# Patient Record
Sex: MALE | Race: WHITE | NOT HISPANIC OR LATINO | Employment: OTHER | URBAN - METROPOLITAN AREA
[De-identification: names, ages, dates, MRNs, and addresses within clinical notes are randomized per-mention and may not be internally consistent; named-entity substitution may affect disease eponyms.]

---

## 2021-07-20 ENCOUNTER — TREATMENT (OUTPATIENT)
Dept: PHYSICAL THERAPY | Facility: CLINIC | Age: 61
End: 2021-07-20

## 2021-07-20 DIAGNOSIS — M47.894 THORACIC FACET JOINT SYNDROME: ICD-10-CM

## 2021-07-20 DIAGNOSIS — M54.6 THORACIC SPINE PAIN: Primary | ICD-10-CM

## 2021-07-20 PROCEDURE — 97140 MANUAL THERAPY 1/> REGIONS: CPT | Performed by: PHYSICAL THERAPIST

## 2021-07-20 PROCEDURE — 97014 ELECTRIC STIMULATION THERAPY: CPT | Performed by: PHYSICAL THERAPIST

## 2021-07-20 PROCEDURE — 97110 THERAPEUTIC EXERCISES: CPT | Performed by: PHYSICAL THERAPIST

## 2021-07-20 PROCEDURE — 97035 APP MDLTY 1+ULTRASOUND EA 15: CPT | Performed by: PHYSICAL THERAPIST

## 2021-07-20 PROCEDURE — 97161 PT EVAL LOW COMPLEX 20 MIN: CPT | Performed by: PHYSICAL THERAPIST

## 2021-07-20 NOTE — PROGRESS NOTES
Physical Therapy Initial Evaluation and Plan of Care        Patient: Lance Orellana   : 1960  Diagnosis/ICD-10 Code:  Thoracic spine pain [M54.6]  Referring practitioner: KRAIG Kim  Date of Initial Visit: 2021  Today's Date: 2021  Patient seen for 1 sessions           Subjective Evaluation    History of Present Illness  Mechanism of injury: May 2021, after playing golf, felt like he was sidebending during swing rather than rotating.  Started experiencing spasms during that day. Has hurt some daily since.        Pain  Current pain ratin  At best pain ratin  At worst pain ratin  Location: Intermittent pain right mid/lower thoracic spine. No radiating symptoms.  Quality: squeezing, cramping and discomfort  Relieving factors: rest and support  Aggravating factors: repetitive movement (golfing especially at ball contact, rotation, side bendiing)  Progression: improved    Hand dominance: right             Objective          Postural Observations  Seated posture: fair  Standing posture: fair    Additional Postural Observation Details  Mild increase in thoracic spine kyphosis.    Palpation     Right   Hypertonic in the cervical paraspinals.     Cervical Spine Comments  Right cervical paraspinals: From about T3 to T10.     Tenderness   Cervical Spine   Tenderness in the facet joint (RIght T7).     Neurological Testing     Reflexes   Left   Patellar (L4): normal (2+)  Achilles (S1): normal (2+)    Right   Patellar (L4): normal (2+)  Achilles (S1): normal (2+)    Active Range of Motion   Cervical/Thoracic Spine     Thoracic   Flexion: WFL  Extension: 15 degrees   Left lateral flexion: 30 degrees   Right lateral flexion: 20 degrees with pain  Left rotation: 70 degrees   Right rotation: 70 degrees with pain    Strength/Myotome Testing   Cervical Spine     Left   Normal strength    Right   Normal strength    Lumbar   Left   Normal strength    Right   Normal strength    Tests        Thoracic   Negative slump.     Lumbar     Left   Negative passive SLR and vertical compression.     Right   Negative passive SLR and vertical compression.       Access Code: [email or web address]  URL: [email or web address]  Date: 07/20/2021  Prepared by: Quang Bustillos    Exercises  Seated Thoracic Lumbar Extension - 3 x daily - 7 x weekly - 3 sets - 10 reps  Standing Thoracic Rotation with Dowel - 3 x daily - 7 x weekly - 2 sets - 10 reps      Assessment & Plan     Assessment  Impairments: activity intolerance and pain with function  Assessment details: 61 year old right handed male, presents with right mid thoracic spine pain. He has signs and symptoms of possible mid-Thoracic facet dysfunction around T7/T8 area. Neurological exam is normal today.  Prognosis: good    Goals  Plan Goals: STG to be met in 3 weeks  1.  Reduce pain in right Thoracic spine 3/10.  2.  Pt can rotate 10 x to the right before pain ensues.  3.  Reduce muscle guarding along right thoracic paraspinals  LTG to be met in 6 weeks  1.  Pt can lift without thoracic spine pain.  2.  Pt can play 18 holes of golf without pain.  3.  Pt is independent with all phases of HEP.    Plan  Therapy options: will be seen for skilled physical therapy services  Planned modality interventions: cryotherapy, dry needling, high voltage pulsed current (pain management), high voltage pulsed current (spasm management) and ultrasound  Planned therapy interventions: manual therapy, body mechanics training, functional ROM exercises, home exercise program, joint mobilization, therapeutic activities, stretching, strengthening, spinal/joint mobilization, soft tissue mobilization, postural training and neuromuscular re-education  Frequency: 2x week  Duration in weeks: 6  Treatment plan discussed with: patient        Manual Therapy:    15     mins  76449;  Therapeutic Exercise:    10     mins  78493;     Neuromuscular Demar:        mins  20877;    Therapeutic Activity:           mins  76801;     Gait Training:           mins  56087;     Ultrasound:     13     mins  13839;    Electrical Stimulation:    20     mins  43680 ( );  Iontophoresis          mins 50139   Traction          mins  63448  Fluidotherapy          mins  66883  Dry Needling          mins self-pay  Traction          mins  65275    Timed Treatment:   38   mins   Total Treatment:     80   mins    PT SIGNATURE: Quang Bustillos, PT   DATE TREATMENT INITIATED: 7/20/2021    Initial Certification  Certification Period: 10/18/2021  I certify that the therapy services are furnished while this patient is under my care.  The services outlined above are required by this patient, and will be reviewed every 90 days.     PHYSICIAN: _____________________________________________     Kena Solano PA        DATE: __________________________________________________    Please sign and return via fax to [phone].. Thank you, [facility].

## 2025-06-24 ENCOUNTER — TREATMENT (OUTPATIENT)
Dept: PHYSICAL THERAPY | Facility: CLINIC | Age: 65
End: 2025-06-24
Payer: MEDICARE

## 2025-06-24 ENCOUNTER — TRANSCRIBE ORDERS (OUTPATIENT)
Dept: PHYSICAL THERAPY | Facility: CLINIC | Age: 65
End: 2025-06-24
Payer: MEDICARE

## 2025-06-24 DIAGNOSIS — M54.2 NECK PAIN: Primary | ICD-10-CM

## 2025-06-24 DIAGNOSIS — M54.2 NECK PAIN ON RIGHT SIDE: ICD-10-CM

## 2025-06-24 DIAGNOSIS — M62.89 MUSCULAR IMBALANCE: Primary | ICD-10-CM

## 2025-06-24 PROCEDURE — 97162 PT EVAL MOD COMPLEX 30 MIN: CPT | Performed by: PHYSICAL THERAPIST

## 2025-06-24 PROCEDURE — 97535 SELF CARE MNGMENT TRAINING: CPT | Performed by: PHYSICAL THERAPIST

## 2025-06-24 NOTE — PROGRESS NOTES
Physical Therapy Initial Evaluation and Plan of Care  [address], [address], [place] [id number]    Patient: Lance Orellana   : 1960  Diagnosis/ICD-10 Code:  Neck pain [M54.2]  Referring practitioner: Felecia Blakely DO  Date of Initial Visit: 2025  Today's Date: 2025  Patient seen for 1 session         Visit Diagnoses:    ICD-10-CM ICD-9-CM   1. Neck pain  M54.2 723.1         Subjective Questionnaire: NDI:12%    There are no active problems to display for this patient.    No past medical history on file.  No past surgical history on file.    Subjective Evaluation    History of Present Illness  Date of onset: 2022  Mechanism of injury: Patient notes right sided neck pain off and on the last 2 years. Attributes it to impaired sleep and sleep apnea.  Sleeps on left side usually, seems to be worse upon waking.  Had basal cell cancer removed on the left neck and has had to sleep on his right side more. That has helped him to some degree.  No treatment to this point until he saw  and did OMT last week with some relief.  Notes nagging and chronic, likes to work out at home.  Notes pain with left cervical rotation, notes some increase pin sneezing. Pain is uncomfortable but doesn't keep him from doing anything. No orthopedic neck surgery history. Axial pain without UE referral. Home exercise routine includes back to wall scapular mobility, wall slides, pec fly movements. Weights: external rotation in different planes, low row, high row. Tried [employer] program through his insurance without relief.  Had thoracic pain a few years ago and had successful improvement after one session of dry needling, is interested.       Patient Occupation: retired  Pain  Current pain ratin  At best pain ratin  At worst pain ratin  Quality: tight and discomfort  Progression: worsening    Hand dominance: right    Treatments  No previous or current treatments  Patient  Goals  Patient goals for therapy: decreased pain and increased motion           Objective          Postural Observations    Additional Postural Observation Details  Large bandaids on the left posterior neck from recent cancer surgery.  Right shoulder lower than left    Tenderness     Additional Tenderness Details  Tender and guarded along the right upper trap with atrophy compared to the left side.  Limited palpation by presence of bandage on the left psoterior neck.  Possible atrophy of the right paravertebral muscles and levator, but difficult to compare.     Neurological Testing     Sensation   Cervical/Thoracic   Left   Intact: light touch    Right   Intact: light touch    Reflexes   Left   Biceps (C5/C6): normal (2+)  Brachioradialis (C6): normal (2+)  Triceps (C7): normal (2+)  Delgado's reflex: negative    Right   Biceps (C5/C6): normal (2+)  Brachioradialis (C6): normal (2+)  Triceps (C7): normal (2+)  Delgado's reflex: negative    Active Range of Motion   Cervical/Thoracic Spine   Cervical    Flexion: 20 degrees with pain  Extension: 30 degrees   Left rotation: 60 degrees with pain  Right rotation: 45 degrees with pain    Additional Active Range of Motion Details  Shoulder AROM grossly normal    Strength/Myotome Testing     Right Shoulder     Planes of Motion   Flexion: 4+   Abduction: 4+   External rotation at 0°: 4+     Right Elbow   Flexion: 5  Extension: 5    Additional Strength Details  Did not test left side due to recent procedure    Tests   Cervical     Right   Positive Spurling's sign.           Assessment & Plan       Assessment  Impairments: abnormal or restricted ROM, activity intolerance, impaired physical strength, lacks appropriate home exercise program and pain with function   Functional limitations: carrying objects, lifting, pulling, pushing, uncomfortable because of pain and unable to perform repetitive tasks   Assessment details: Patient presents with chronic right sided neck pain that  appears to be mechanical and axial in nature. Evaluation was made difficult by recent left sided basal cell CA surgery.  He gets sutures removed next week, will resume PT afterwards for more thorough evaluation.  Assuming we will be working on mobilizing the right sided cervical spine, possible dry needling, and scapular stabilization.     Goals  Plan Goals: Short Term Goals 3 weeks  1. Patient to be compliant with initial HEP  2. Patient to demonstrate pain free cervical right rotation.  3 Patient to demonstrate at least 60 degrees right cervical rotation.    Long Term Goals 6 weeks  1. Patient to be independent with HEP.  2. Patient to report reduced HA frequency  3. Patient to resume recreational weight lifting without pain  4. Patient to demonstrate minimal palpable tenderness along the cervical spine.  5. Patient to improve NDI score to at least 4 %.          Plan  Therapy options: will be seen for skilled therapy services  Planned modality interventions: TENS, thermotherapy (hydrocollator packs), cryotherapy and dry needling  Planned therapy interventions: manual therapy, strengthening, stretching, postural training, joint mobilization and neuromuscular re-education  Frequency: 2x week  Duration in visits: 12  Treatment plan discussed with: patient  Plan details: Resuming after suture removal from previous procedure.         History # of Personal Factors and/or Comorbidities: MODERATE (1-2)  Examination of Body System(s): # of elements: MODERATE (3)  Clinical Presentation: EVOLVING  Clinical Decision Making: MODERATE      Timed:         Manual Therapy:    0     mins  76398;     Therapeutic Exercise:    0     mins  22607;     Neuromuscular Demar:    0    mins  50866;    Therapeutic Activity:     0     mins  74496;     Gait Trainin     mins  23318;     Ultrasound:     0     mins  61951;    Ionto                               0    mins   92942  Self Care                       10     mins    48634        Un-Timed:  Electrical Stimulation:    0     mins  52627 ( );  Dry Needling     0     mins self-pay  Traction     0     mins 14704  Low Eval     0     Mins  51722  Mod Eval     28     Mins  75711  High Eval                       0     Mins  59324  Canalith Repos    0     mins 59651      Timed Treatment:   10   mins   Total Treatment:     38   mins          PT: Gus Hardy, PT     License Number: [id number]    Electronically signed by Gus Hardy, PT, 06/24/25, 11:09 AM EDT    Certification Period: 6/24/2025 thru 9/21/2025  I certify that the therapy services are furnished while this patient is under my care.  The services outlined above are required by this patient, and will be reviewed every 90 days.         Physician Signature:__________________________________________________    PHYSICIAN: Felecia Blakely DO  NPI: [id number]      DATE:     Please sign and return via fax to [email or web address] . Thank you, [facility].

## 2025-07-03 ENCOUNTER — TELEPHONE (OUTPATIENT)
Dept: SLEEP MEDICINE | Age: 65
End: 2025-07-03
Payer: MEDICARE

## 2025-07-07 ENCOUNTER — TREATMENT (OUTPATIENT)
Dept: PHYSICAL THERAPY | Facility: CLINIC | Age: 65
End: 2025-07-07
Payer: MEDICARE

## 2025-07-07 DIAGNOSIS — M54.2 NECK PAIN: Primary | ICD-10-CM

## 2025-07-07 PROCEDURE — 97112 NEUROMUSCULAR REEDUCATION: CPT | Performed by: PHYSICAL THERAPIST

## 2025-07-07 PROCEDURE — 97140 MANUAL THERAPY 1/> REGIONS: CPT | Performed by: PHYSICAL THERAPIST

## 2025-07-07 PROCEDURE — 97110 THERAPEUTIC EXERCISES: CPT | Performed by: PHYSICAL THERAPIST

## 2025-07-07 NOTE — PROGRESS NOTES
Physical Therapy Daily Treatment Note  [address], [address], [facility] [address]      Patient: Lance Orellana   : 1960  Referring practitioner: Felecia Blakely DO  Date of Initial Visit: Type: THERAPY  Noted: 2025  Today's Date: 2025  Patient seen for 2 sessions       Visit Diagnoses:    ICD-10-CM ICD-9-CM   1. Neck pain  M54.2 723.1       Subjective   Patient reports that the neck is still hurting along the right side.  Now unrestricted from the left sided standpoint.  his pain level is 3/10 upon arrival.      Objective   Right sided lower cervical hypomobility SGL/PA planes.    See Exercise, Manual, and Modality Logs for complete treatment.       Assessment & Plan       Assessment  Assessment details: We were able to perform more of a formal assessment today that resulted in hypomobility in the lower cervical spine, as expected. We worked on thoracic activities and made suggestions for handling pain while driving.  May consider Trial DN at next session.           Timed:         Manual Therapy:    10     mins  13961;     Therapeutic Exercise:    25     mins  26302;     Neuromuscular Demar:    10    mins  75437;    Therapeutic Activity:     0     mins  91664;     Gait Trainin     mins  13415;     Ultrasound:     0     mins  76438;    Ionto                               0    mins   18848  Self Care                       0     mins   78977  Canalith Repos    0     mins 74957      Un-Timed:  Electrical Stimulation:    0     mins  22496 ( );  Dry Needling     0     mins self-pay  Traction     0     mins 43687      Timed Treatment:   45   mins   Total Treatment:     45   mins    Gus Hardy, PT  [place] License: [id number]

## 2025-07-10 ENCOUNTER — TREATMENT (OUTPATIENT)
Dept: PHYSICAL THERAPY | Facility: CLINIC | Age: 65
End: 2025-07-10
Payer: MEDICARE

## 2025-07-10 DIAGNOSIS — M54.2 NECK PAIN: Primary | ICD-10-CM

## 2025-07-10 PROCEDURE — 97140 MANUAL THERAPY 1/> REGIONS: CPT | Performed by: PHYSICAL THERAPIST

## 2025-07-10 PROCEDURE — 97112 NEUROMUSCULAR REEDUCATION: CPT | Performed by: PHYSICAL THERAPIST

## 2025-07-10 PROCEDURE — 97110 THERAPEUTIC EXERCISES: CPT | Performed by: PHYSICAL THERAPIST

## 2025-07-10 NOTE — PROGRESS NOTES
Physical Therapy Daily Treatment Note  [address] [facility], [address], [facility] [address]      Patient: Lance Orellana   : 1960  Referring practitioner: Felecia Blakely DO  Date of Initial Visit: Type: THERAPY  Noted: 2025  Today's Date: 7/10/2025  Patient seen for 3 sessions       Visit Diagnoses:    ICD-10-CM ICD-9-CM   1. Neck pain  M54.2 723.1       Subjective   Patient reports that he has felt less distinct pain and more diffuse soreness. He has felt some better. Tried to work on neck position driving, that helped.  his pain level is 2/10 upon arrival.      Objective   See Exercise, Manual, and Modality Logs for complete treatment.       Assessment & Plan       Assessment  Assessment details: Patient was less restricted with mobility today and has had less irritability overall. He has done well with initial interventions.           Timed:         Manual Therapy:    13     mins  74535;     Therapeutic Exercise:    25     mins  36616;     Neuromuscular Demar:    10    mins  77727;    Therapeutic Activity:     0     mins  53790;     Gait Trainin     mins  30904;     Ultrasound:     0     mins  51356;    Ionto                               0    mins   64486  Self Care                       0     mins   18075  Canalith Repos    0     mins 72851      Un-Timed:  Electrical Stimulation:    0     mins  43753 ( );  Dry Needling     0     mins self-pay  Traction     0     mins 01463      Timed Treatment:   48   mins   Total Treatment:     48   mins    Gus Hardy, PT  [place] License: [id number]       Tissue Cultured Epidermal Autograft Text: The defect edges were debeveled with a #15 scalpel blade.  Given the location of the defect, shape of the defect and the proximity to free margins a tissue cultured epidermal autograft was deemed most appropriate.  The graft was then trimmed to fit the size of the defect.  The graft was then placed in the primary defect and oriented appropriately.

## 2025-07-14 ENCOUNTER — TREATMENT (OUTPATIENT)
Dept: PHYSICAL THERAPY | Facility: CLINIC | Age: 65
End: 2025-07-14
Payer: MEDICARE

## 2025-07-14 DIAGNOSIS — M54.2 NECK PAIN: Primary | ICD-10-CM

## 2025-07-14 PROCEDURE — 97110 THERAPEUTIC EXERCISES: CPT | Performed by: PHYSICAL THERAPIST

## 2025-07-14 PROCEDURE — 97112 NEUROMUSCULAR REEDUCATION: CPT | Performed by: PHYSICAL THERAPIST

## 2025-07-14 PROCEDURE — 97140 MANUAL THERAPY 1/> REGIONS: CPT | Performed by: PHYSICAL THERAPIST

## 2025-07-14 NOTE — PROGRESS NOTES
Physical Therapy Daily Treatment Note  [address], [address], [facility] [address]      Patient: Lance Orellana   : 1960  Referring practitioner: Felecia Blakely DO  Date of Initial Visit: Type: THERAPY  Noted: 2025  Today's Date: 2025  Patient seen for 4 sessions       Visit Diagnoses:    ICD-10-CM ICD-9-CM   1. Neck pain  M54.2 723.1       Subjective   Patient reports that he has felt better. Has much less trap pain, still some posterior cervical pain.  Has found driving to be more tolerable.  his pain level is 2/10 upon arrival.      Objective   See Exercise, Manual, and Modality Logs for complete treatment.       Assessment & Plan       Assessment  Assessment details: Patient is progressing well, overall.  We continued to work on cervical stabilization and strengthening.  Added angry cat and qped retractions to HEP.           Timed:         Manual Therapy:    10     mins  51775;     Therapeutic Exercise:    25     mins  97451;     Neuromuscular Demar:    15    mins  35791;    Therapeutic Activity:     0     mins  37249;     Gait Trainin     mins  03727;     Ultrasound:     0     mins  78280;    Ionto                               0    mins   89137  Self Care                       0     mins   30113  Canalith Repos    0     mins 93329      Un-Timed:  Electrical Stimulation:    0     mins  06160 ( );  Dry Needling     0     mins self-pay  Traction     0     mins 29749      Timed Treatment:   50   mins   Total Treatment:     50   mins    Gus Hardy, PT  [place] License: [id number]

## 2025-07-15 ENCOUNTER — OFFICE VISIT (OUTPATIENT)
Dept: SLEEP MEDICINE | Age: 65
End: 2025-07-15
Payer: MEDICARE

## 2025-07-15 VITALS
OXYGEN SATURATION: 93 % | WEIGHT: 178.4 LBS | HEIGHT: 71 IN | SYSTOLIC BLOOD PRESSURE: 138 MMHG | DIASTOLIC BLOOD PRESSURE: 88 MMHG | BODY MASS INDEX: 24.98 KG/M2 | TEMPERATURE: 98.5 F | HEART RATE: 83 BPM

## 2025-07-15 DIAGNOSIS — G47.33 OSA (OBSTRUCTIVE SLEEP APNEA): ICD-10-CM

## 2025-07-15 DIAGNOSIS — Z78.9 DIFFICULTY WITH CPAP USE: Primary | ICD-10-CM

## 2025-07-15 DIAGNOSIS — G47.19 EXCESSIVE DAYTIME SLEEPINESS: ICD-10-CM

## 2025-07-15 PROBLEM — E78.5 HYPERLIPIDEMIA: Status: ACTIVE | Noted: 2021-05-04

## 2025-07-15 PROBLEM — I49.3 PVC (PREMATURE VENTRICULAR CONTRACTION): Status: ACTIVE | Noted: 2023-01-19

## 2025-07-15 PROBLEM — I10 HYPERTENSION: Status: ACTIVE | Noted: 2021-05-04

## 2025-07-15 RX ORDER — LISINOPRIL 20 MG/1
20 TABLET ORAL DAILY
COMMUNITY

## 2025-07-15 RX ORDER — METOPROLOL SUCCINATE 50 MG/1
50 TABLET, EXTENDED RELEASE ORAL DAILY
COMMUNITY
Start: 2025-07-01

## 2025-07-15 RX ORDER — TADALAFIL 5 MG/1
5 TABLET ORAL DAILY
COMMUNITY
Start: 2025-07-01

## 2025-07-15 NOTE — PROGRESS NOTES
"  Lance Orellana is a 65 y.o. male.   Chief Complaint   Patient presents with    Snoring    Daytime Sleepiness    Heartburn       HPI     65 y.o. male seen in consultation at the request of KRAIG Ochoa for evaluation of the above.     He has a longstanding history of obstructive sleep apnea.  I saw him in our clinic in 2013.  He underwent a PSG in February of that year which revealed an AHI of 86.  He was poorly tolerant of CPAP during the titration portion of the study.  He subsequently tried using it at home without success despite trying multiple masks.    We eventually decided on a mandibular advancement device and he had this fabricated initially by Dr. Bassett and subsequently by another dentist.  He continues to use this.  He notes that it helps with his snoring but snoring is still present and his wife sleeps in a separate room as a result.  He does also sleep on his side.    He does admit to some residual daytime somnolence.  He keeps a regular sleep schedule and averages 7-8 hours of sleep per night and will nap an hour during the day if able.    [place] Scale is: 4/24    The patient's relevant past medical, surgical, family, and social history reviewed and updated in Epic as appropriate.    Current medications are:   Current Outpatient Medications:     metoprolol succinate XL (TOPROL-XL) 50 MG 24 hr tablet, Take 1 tablet by mouth Daily., Disp: , Rfl:     tadalafil (CIALIS) 5 MG tablet, Take 1 tablet by mouth Daily., Disp: , Rfl:     lisinopril (PRINIVIL,ZESTRIL) 20 MG tablet, Take 1 tablet by mouth Daily., Disp: , Rfl: .    Review of Systems    Review of Systems  ROS documented in patient questionnaire ×14 systems.  Otherwise negative except as noted in HPI.    Physical Exam    Blood pressure 138/88, pulse 83, temperature 98.5 °F (36.9 °C), temperature source Infrared, height 180.3 cm (71\"), weight 80.9 kg (178 lb 6.4 oz), SpO2 93%. Body mass index is 24.88 kg/m².    Physical Exam  Vitals and " nursing note reviewed.   Constitutional:       Appearance: Normal appearance. He is well-developed.   HENT:      Head: Normocephalic and atraumatic.      Nose: Nose normal.      Mouth/Throat:      Mouth: Mucous membranes are moist.      Pharynx: Oropharynx is clear. No oropharyngeal exudate.      Comments: Class IV airway  Eyes:      General: No scleral icterus.     Conjunctiva/sclera: Conjunctivae normal.   Neck:      Thyroid: No thyromegaly.      Trachea: No tracheal deviation.   Cardiovascular:      Rate and Rhythm: Normal rate and regular rhythm.      Heart sounds: No murmur heard.     No friction rub. No gallop.   Pulmonary:      Effort: Pulmonary effort is normal. No respiratory distress.      Breath sounds: No wheezing or rales.   Musculoskeletal:         General: No deformity. Normal range of motion.   Skin:     General: Skin is warm and dry.      Findings: No rash.   Neurological:      Mental Status: He is alert and oriented to person, place, and time.   Psychiatric:         Behavior: Behavior normal.         Thought Content: Thought content normal.         DATA:    Reviewed PSG report dated 2/28/2013 as described above    Reviewed bed partner questionnaire    ASSESSMENT:    Problem List Items Addressed This Visit          Pulmonary Problems    CAR (obstructive sleep apnea)    Relevant Orders    Home Sleep Study       Other    Difficulty with CPAP use - Primary    Relevant Orders    Home Sleep Study     Other Visit Diagnoses         Excessive daytime sleepiness        Relevant Orders    Home Sleep Study            65-year-old male with a history of severe obstructive sleep apnea originally diagnosed in 2013.  At the time he was very PAP intolerant and we decided on a mandibular advancement device and he is used that with some success since that time though he still has some residual daytime somnolence and residual snoring as would be expected given the severity of his original diagnosis.    He is interested  in rethinking his therapy at this point.  He was open to trying CPAP again.  BiPAP was never tried in the past but this would be an option.  He was even open to using PAP therapy along with a mandibular advancement device.  We touched on the possibility of an inspire device though he would prefer to avoid that if possible.    PLAN:    We discussed the diagnostic options and decided on a 2 night home study.  One night will be without his MAD in place and the other night will be with it in place.  This way we can reestablish the diagnosis if we wanted to proceed with another form of therapy and we can also assess the degree to which his MAD is helping  As above he would be open to trying PAP therapy again and possibly BiPAP would be more comfortable for him but we would need to start with CPAP from an insurance perspective  All the above was discussed in detail with patient and with his wife who was also present for the visit.    I have reviewed the results of my evaluation and impression and discussed my recommendations in detail with the patient.    Signed by  Geo Pryor MD    July 15, 2025      CC: Kena Solano PA          No ref. provider found

## 2025-07-16 ENCOUNTER — TREATMENT (OUTPATIENT)
Dept: PHYSICAL THERAPY | Facility: CLINIC | Age: 65
End: 2025-07-16
Payer: MEDICARE

## 2025-07-16 DIAGNOSIS — M54.2 NECK PAIN: Primary | ICD-10-CM

## 2025-07-16 PROCEDURE — 97110 THERAPEUTIC EXERCISES: CPT | Performed by: PHYSICAL THERAPIST

## 2025-07-16 PROCEDURE — 97140 MANUAL THERAPY 1/> REGIONS: CPT | Performed by: PHYSICAL THERAPIST

## 2025-07-16 PROCEDURE — 97112 NEUROMUSCULAR REEDUCATION: CPT | Performed by: PHYSICAL THERAPIST

## 2025-07-16 NOTE — PROGRESS NOTES
Physical Therapy Daily Treatment Note  [address], [address], [facility] [address]      Patient: Lance Orellana   : 1960  Referring practitioner: Felecia Blakely DO  Date of Initial Visit: Type: THERAPY  Noted: 2025  Today's Date: 2025  Patient seen for 5 sessions       Visit Diagnoses:    ICD-10-CM ICD-9-CM   1. Neck pain  M54.2 723.1       Subjective   Patient reports generally has felt better but has vanessa a little more sore the last day or so. Soreness in the right posterior neck radiating into the upper trap region.  his pain level is 2-3/10 upon arrival.      Objective   See Exercise, Manual, and Modality Logs for complete treatment.       Assessment & Plan       Assessment  Assessment details: Patient reports fairly significant reduction in soreness after manual interventions and self snag with extension movements.  Will take a week off of PT due to primary therapist being out of town, follow up in two weeks with reassessment due.           Timed:         Manual Therapy:    10     mins  17539;     Therapeutic Exercise:    25     mins  52726;     Neuromuscular Demar:    15    mins  04114;    Therapeutic Activity:     0     mins  64444;     Gait Trainin     mins  01062;     Ultrasound:     0     mins  69004;    Ionto                               0    mins   27506  Self Care                       0     mins   34194  Canalith Repos    0     mins 56234      Un-Timed:  Electrical Stimulation:    0     mins  58458 ( );  Dry Needling     0     mins self-pay  Traction     0     mins 10941      Timed Treatment:   50   mins   Total Treatment:     50   mins    Gus Hardy, PT  [place] License: [id number]

## 2025-07-29 ENCOUNTER — TREATMENT (OUTPATIENT)
Dept: PHYSICAL THERAPY | Facility: CLINIC | Age: 65
End: 2025-07-29
Payer: MEDICARE

## 2025-07-29 DIAGNOSIS — M54.2 NECK PAIN: Primary | ICD-10-CM

## 2025-07-29 PROCEDURE — 97110 THERAPEUTIC EXERCISES: CPT | Performed by: PHYSICAL THERAPIST

## 2025-07-29 PROCEDURE — 97112 NEUROMUSCULAR REEDUCATION: CPT | Performed by: PHYSICAL THERAPIST

## 2025-07-29 PROCEDURE — 97140 MANUAL THERAPY 1/> REGIONS: CPT | Performed by: PHYSICAL THERAPIST

## 2025-07-29 NOTE — PROGRESS NOTES
Physical Therapy Daily Treatment Note  [address], [address], [facility] [address]      Patient: Lance Orellana   : 1960  Referring practitioner: Felecia Blakely DO  Date of Initial Visit: Type: THERAPY  Noted: 2025  Today's Date: 2025  Patient seen for 6 sessions       Visit Diagnoses:    ICD-10-CM ICD-9-CM   1. Neck pain  M54.2 723.1       Subjective   Patient reports that the muscular pain is better and looking left is better. Still has some pain looking right. Extends into the right superior shoulder.  his pain level is 2/10 upon arrival.      Objective   See Exercise, Manual, and Modality Logs for complete treatment.       Assessment & Plan       Assessment  Assessment details: We continued to work on mobilizing the joint in the right cervical spine, which results in overall improvement in symptoms. He still has some muscular tightness present, but seems to be improving with his HEP.  Plan to potentially reduce in-clinic frequency after next session continuing to progress his program.           Timed:         Manual Therapy:    10     mins  20501;     Therapeutic Exercise:    25     mins  11684;     Neuromuscular Demar:    20    mins  03896;    Therapeutic Activity:     0     mins  53888;     Gait Trainin     mins  42214;     Ultrasound:     0     mins  42910;    Ionto                               0    mins   66414  Self Care                       0     mins   60598  Canalith Repos    0     mins 09338      Un-Timed:  Electrical Stimulation:    0     mins  96283 ( );  Dry Needling     0     mins self-pay  Traction     0     mins 07161      Timed Treatment:   55   mins   Total Treatment:     55   mins    Gus Hardy, PT  [place] License: [id number]

## 2025-08-01 ENCOUNTER — TREATMENT (OUTPATIENT)
Dept: PHYSICAL THERAPY | Facility: CLINIC | Age: 65
End: 2025-08-01
Payer: MEDICARE

## 2025-08-01 DIAGNOSIS — M54.2 NECK PAIN: Primary | ICD-10-CM

## 2025-08-01 PROCEDURE — 97110 THERAPEUTIC EXERCISES: CPT | Performed by: PHYSICAL THERAPIST

## 2025-08-01 PROCEDURE — 97112 NEUROMUSCULAR REEDUCATION: CPT | Performed by: PHYSICAL THERAPIST

## 2025-08-01 PROCEDURE — 97140 MANUAL THERAPY 1/> REGIONS: CPT | Performed by: PHYSICAL THERAPIST

## 2025-08-01 NOTE — PROGRESS NOTES
Physical Therapy Daily Treatment Note  [address] [facility] [address] [facility], [address], [facility] [address]      Patient: Lance Orellana   : 1960  Referring practitioner: Felecia Blakely DO  Date of Initial Visit: Type: THERAPY  Noted: 2025  Today's Date: 2025  Patient seen for 7 sessions       Visit Diagnoses:    ICD-10-CM ICD-9-CM   1. Neck pain  M54.2 723.1       Subjective   Patient reports he is beginning to be able to move the neck without shrugging. Played golf, had some mild discomfort. Feels he is getting better.  his pain level is 2/10 upon arrival.      Objective   See Exercise, Manual, and Modality Logs for complete treatment.       Assessment & Plan       Assessment  Assessment details: Patient is progressing well and is making good improvement.  As he dissociates lateral flexion and elevation, this seems to correlate with overall improvement.  Plan is to reduce to weekly appts going forward.           Timed:         Manual Therapy:    10     mins  15517;     Therapeutic Exercise:    25     mins  46821;     Neuromuscular Demar:    10    mins  20363;    Therapeutic Activity:     0     mins  47454;     Gait Trainin     mins  14908;     Ultrasound:     0     mins  82027;    Ionto                               0    mins   41710  Self Care                       0     mins   35866  Canalith Repos    0     mins 24007      Un-Timed:  Electrical Stimulation:    0     mins  19470 ( );  Dry Needling     0     mins self-pay  Traction     0     mins 85304      Timed Treatment:   45   mins   Total Treatment:     45   mins    Gus Hardy, PT  [place] License: [id number]

## 2025-08-05 ENCOUNTER — TREATMENT (OUTPATIENT)
Dept: PHYSICAL THERAPY | Facility: CLINIC | Age: 65
End: 2025-08-05
Payer: MEDICARE

## 2025-08-05 DIAGNOSIS — M54.2 NECK PAIN: Primary | ICD-10-CM

## 2025-08-05 PROCEDURE — 97112 NEUROMUSCULAR REEDUCATION: CPT | Performed by: PHYSICAL THERAPIST

## 2025-08-05 PROCEDURE — 97110 THERAPEUTIC EXERCISES: CPT | Performed by: PHYSICAL THERAPIST

## 2025-08-05 PROCEDURE — 97140 MANUAL THERAPY 1/> REGIONS: CPT | Performed by: PHYSICAL THERAPIST

## 2025-08-08 ENCOUNTER — HOSPITAL ENCOUNTER (OUTPATIENT)
Dept: SLEEP MEDICINE | Facility: HOSPITAL | Age: 65
Discharge: HOME | End: 2025-08-08
Admitting: INTERNAL MEDICINE
Payer: MEDICARE

## 2025-08-08 VITALS — WEIGHT: 178 LBS | BODY MASS INDEX: 24.92 KG/M2 | HEIGHT: 71 IN

## 2025-08-08 DIAGNOSIS — G47.19 EXCESSIVE DAYTIME SLEEPINESS: ICD-10-CM

## 2025-08-08 DIAGNOSIS — G47.33 OSA (OBSTRUCTIVE SLEEP APNEA): ICD-10-CM

## 2025-08-08 DIAGNOSIS — Z78.9 DIFFICULTY WITH CPAP USE: ICD-10-CM

## 2025-08-08 PROCEDURE — G0399 HOME SLEEP TEST/TYPE 3 PORTA: HCPCS

## 2025-08-14 ENCOUNTER — TREATMENT (OUTPATIENT)
Dept: PHYSICAL THERAPY | Facility: CLINIC | Age: 65
End: 2025-08-14
Payer: MEDICARE

## 2025-08-14 ENCOUNTER — TELEPHONE (OUTPATIENT)
Dept: SLEEP MEDICINE | Age: 65
End: 2025-08-14
Payer: MEDICARE

## 2025-08-14 DIAGNOSIS — M54.2 NECK PAIN: Primary | ICD-10-CM

## 2025-08-14 PROCEDURE — 97110 THERAPEUTIC EXERCISES: CPT | Performed by: PHYSICAL THERAPIST

## 2025-08-14 PROCEDURE — 97140 MANUAL THERAPY 1/> REGIONS: CPT | Performed by: PHYSICAL THERAPIST

## 2025-08-14 PROCEDURE — 97112 NEUROMUSCULAR REEDUCATION: CPT | Performed by: PHYSICAL THERAPIST

## 2025-08-15 DIAGNOSIS — G47.33 OSA (OBSTRUCTIVE SLEEP APNEA): Primary | ICD-10-CM

## 2025-08-18 ENCOUNTER — TELEPHONE (OUTPATIENT)
Dept: SLEEP MEDICINE | Age: 65
End: 2025-08-18
Payer: MEDICARE

## 2025-08-22 ENCOUNTER — TREATMENT (OUTPATIENT)
Dept: PHYSICAL THERAPY | Facility: CLINIC | Age: 65
End: 2025-08-22
Payer: MEDICARE

## 2025-08-22 DIAGNOSIS — M54.2 NECK PAIN: Primary | ICD-10-CM

## 2025-08-22 PROCEDURE — 97140 MANUAL THERAPY 1/> REGIONS: CPT | Performed by: PHYSICAL THERAPIST

## 2025-08-22 PROCEDURE — 97110 THERAPEUTIC EXERCISES: CPT | Performed by: PHYSICAL THERAPIST

## 2025-08-22 PROCEDURE — 97112 NEUROMUSCULAR REEDUCATION: CPT | Performed by: PHYSICAL THERAPIST

## 2025-08-27 ENCOUNTER — TREATMENT (OUTPATIENT)
Dept: PHYSICAL THERAPY | Facility: CLINIC | Age: 65
End: 2025-08-27
Payer: MEDICARE

## 2025-08-27 DIAGNOSIS — M54.2 NECK PAIN: Primary | ICD-10-CM

## 2025-08-27 PROCEDURE — 97140 MANUAL THERAPY 1/> REGIONS: CPT | Performed by: PHYSICAL THERAPIST

## 2025-08-27 PROCEDURE — 97112 NEUROMUSCULAR REEDUCATION: CPT | Performed by: PHYSICAL THERAPIST

## 2025-08-27 PROCEDURE — 97110 THERAPEUTIC EXERCISES: CPT | Performed by: PHYSICAL THERAPIST
